# Patient Record
Sex: FEMALE | Race: WHITE | NOT HISPANIC OR LATINO
[De-identification: names, ages, dates, MRNs, and addresses within clinical notes are randomized per-mention and may not be internally consistent; named-entity substitution may affect disease eponyms.]

---

## 2017-11-27 PROBLEM — Z00.00 ENCOUNTER FOR PREVENTIVE HEALTH EXAMINATION: Noted: 2017-11-27

## 2017-12-01 ENCOUNTER — APPOINTMENT (OUTPATIENT)
Dept: HOME HEALTH SERVICES | Facility: HOME HEALTH | Age: 82
End: 2017-12-01
Payer: MEDICARE

## 2017-12-01 VITALS
SYSTOLIC BLOOD PRESSURE: 140 MMHG | HEIGHT: 66 IN | DIASTOLIC BLOOD PRESSURE: 70 MMHG | BODY MASS INDEX: 26.36 KG/M2 | OXYGEN SATURATION: 98 % | WEIGHT: 164 LBS | RESPIRATION RATE: 14 BRPM | TEMPERATURE: 98.8 F | HEART RATE: 68 BPM

## 2017-12-01 VITALS
RESPIRATION RATE: 14 BRPM | DIASTOLIC BLOOD PRESSURE: 70 MMHG | SYSTOLIC BLOOD PRESSURE: 140 MMHG | TEMPERATURE: 98.8 F | HEART RATE: 68 BPM

## 2017-12-01 DIAGNOSIS — Z85.038 PERSONAL HISTORY OF OTHER MALIGNANT NEOPLASM OF LARGE INTESTINE: ICD-10-CM

## 2017-12-01 DIAGNOSIS — Z63.4 DISAPPEARANCE AND DEATH OF FAMILY MEMBER: ICD-10-CM

## 2017-12-01 DIAGNOSIS — Z87.81 PERSONAL HISTORY OF (HEALED) TRAUMATIC FRACTURE: ICD-10-CM

## 2017-12-01 DIAGNOSIS — Z87.891 PERSONAL HISTORY OF NICOTINE DEPENDENCE: ICD-10-CM

## 2017-12-01 PROCEDURE — 99497 ADVNCD CARE PLAN 30 MIN: CPT

## 2017-12-01 PROCEDURE — 99345 HOME/RES VST NEW HIGH MDM 75: CPT | Mod: 25

## 2017-12-01 PROCEDURE — G0506: CPT

## 2017-12-01 SDOH — SOCIAL STABILITY - SOCIAL INSECURITY: DISSAPEARANCE AND DEATH OF FAMILY MEMBER: Z63.4

## 2017-12-21 ENCOUNTER — MEDICATION RENEWAL (OUTPATIENT)
Age: 82
End: 2017-12-21

## 2018-01-02 ENCOUNTER — CLINICAL ADVICE (OUTPATIENT)
Age: 83
End: 2018-01-02

## 2018-01-02 DIAGNOSIS — N39.0 URINARY TRACT INFECTION, SITE NOT SPECIFIED: ICD-10-CM

## 2018-01-30 ENCOUNTER — CLINICAL ADVICE (OUTPATIENT)
Age: 83
End: 2018-01-30

## 2018-02-09 ENCOUNTER — APPOINTMENT (OUTPATIENT)
Dept: HOME HEALTH SERVICES | Facility: HOME HEALTH | Age: 83
End: 2018-02-09
Payer: MEDICARE

## 2018-02-09 VITALS
HEART RATE: 77 BPM | RESPIRATION RATE: 14 BRPM | DIASTOLIC BLOOD PRESSURE: 60 MMHG | HEIGHT: 66 IN | OXYGEN SATURATION: 93 % | SYSTOLIC BLOOD PRESSURE: 106 MMHG | WEIGHT: 164 LBS | BODY MASS INDEX: 26.36 KG/M2 | TEMPERATURE: 99.4 F

## 2018-02-09 DIAGNOSIS — R05 COUGH: ICD-10-CM

## 2018-02-09 DIAGNOSIS — R55 SYNCOPE AND COLLAPSE: ICD-10-CM

## 2018-02-09 DIAGNOSIS — R09.89 OTHER SPECIFIED SYMPTOMS AND SIGNS INVOLVING THE CIRCULATORY AND RESPIRATORY SYSTEMS: ICD-10-CM

## 2018-02-09 DIAGNOSIS — F33.9 MAJOR DEPRESSIVE DISORDER, RECURRENT, UNSPECIFIED: ICD-10-CM

## 2018-02-09 DIAGNOSIS — Z86.59 PERSONAL HISTORY OF OTHER MENTAL AND BEHAVIORAL DISORDERS: ICD-10-CM

## 2018-02-09 PROCEDURE — 99350 HOME/RES VST EST HIGH MDM 60: CPT

## 2018-02-09 RX ORDER — CIPROFLOXACIN HYDROCHLORIDE 250 MG/1
250 TABLET, FILM COATED ORAL
Qty: 14 | Refills: 1 | Status: DISCONTINUED | COMMUNITY
Start: 2018-01-02 | End: 2018-02-09

## 2018-03-16 ENCOUNTER — MED ADMIN CHARGE (OUTPATIENT)
Age: 83
End: 2018-03-16

## 2018-03-16 ENCOUNTER — MEDICATION RENEWAL (OUTPATIENT)
Age: 83
End: 2018-03-16

## 2018-04-19 ENCOUNTER — MEDICATION RENEWAL (OUTPATIENT)
Age: 83
End: 2018-04-19

## 2018-04-27 ENCOUNTER — APPOINTMENT (OUTPATIENT)
Dept: HOME HEALTH SERVICES | Facility: HOME HEALTH | Age: 83
End: 2018-04-27
Payer: MEDICARE

## 2018-04-27 VITALS
HEART RATE: 72 BPM | BODY MASS INDEX: 26.36 KG/M2 | DIASTOLIC BLOOD PRESSURE: 70 MMHG | OXYGEN SATURATION: 96 % | HEIGHT: 66 IN | RESPIRATION RATE: 15 BRPM | WEIGHT: 164 LBS | TEMPERATURE: 96.1 F | SYSTOLIC BLOOD PRESSURE: 110 MMHG

## 2018-04-27 DIAGNOSIS — R26.81 UNSTEADINESS ON FEET: ICD-10-CM

## 2018-04-27 PROCEDURE — 99350 HOME/RES VST EST HIGH MDM 60: CPT

## 2018-04-27 RX ORDER — LORAZEPAM 1 MG/1
1 TABLET ORAL EVERY 8 HOURS
Qty: 1 | Refills: 0 | Status: DISCONTINUED | COMMUNITY
Start: 2018-04-19 | End: 2018-04-27

## 2018-04-27 RX ORDER — BUPROPION HYDROCHLORIDE 100 MG/1
100 TABLET, FILM COATED, EXTENDED RELEASE ORAL DAILY
Qty: 30 | Refills: 3 | Status: DISCONTINUED | COMMUNITY
Start: 2017-12-01 | End: 2018-04-27

## 2018-04-29 PROBLEM — R26.81 GAIT INSTABILITY: Status: ACTIVE | Noted: 2017-12-01

## 2018-05-31 ENCOUNTER — MEDICATION RENEWAL (OUTPATIENT)
Age: 83
End: 2018-05-31

## 2018-06-28 ENCOUNTER — RX RENEWAL (OUTPATIENT)
Age: 83
End: 2018-06-28

## 2018-07-06 ENCOUNTER — APPOINTMENT (OUTPATIENT)
Dept: HOME HEALTH SERVICES | Facility: HOME HEALTH | Age: 83
End: 2018-07-06
Payer: MEDICARE

## 2018-07-06 VITALS
HEART RATE: 75 BPM | HEIGHT: 66 IN | OXYGEN SATURATION: 95 % | BODY MASS INDEX: 26.36 KG/M2 | DIASTOLIC BLOOD PRESSURE: 64 MMHG | WEIGHT: 164 LBS | RESPIRATION RATE: 15 BRPM | TEMPERATURE: 99 F | SYSTOLIC BLOOD PRESSURE: 120 MMHG

## 2018-07-06 PROCEDURE — 99350 HOME/RES VST EST HIGH MDM 60: CPT

## 2018-07-12 ENCOUNTER — RX RENEWAL (OUTPATIENT)
Age: 83
End: 2018-07-12

## 2018-08-07 ENCOUNTER — APPOINTMENT (OUTPATIENT)
Dept: HOME HEALTH SERVICES | Facility: HOME HEALTH | Age: 83
End: 2018-08-07
Payer: MEDICARE

## 2018-08-07 VITALS
OXYGEN SATURATION: 95 % | SYSTOLIC BLOOD PRESSURE: 130 MMHG | TEMPERATURE: 99 F | RESPIRATION RATE: 15 BRPM | HEART RATE: 72 BPM | DIASTOLIC BLOOD PRESSURE: 68 MMHG

## 2018-08-07 PROCEDURE — 99349 HOME/RES VST EST MOD MDM 40: CPT

## 2018-08-18 RX ORDER — QUETIAPINE 25 MG/1
25 TABLET, FILM COATED ORAL DAILY
Qty: 1 | Refills: 0 | Status: DISCONTINUED | COMMUNITY
Start: 2018-04-27 | End: 2018-08-18

## 2018-08-31 ENCOUNTER — RX RENEWAL (OUTPATIENT)
Age: 83
End: 2018-08-31

## 2018-09-11 ENCOUNTER — APPOINTMENT (OUTPATIENT)
Dept: HOME HEALTH SERVICES | Facility: HOME HEALTH | Age: 83
End: 2018-09-11
Payer: MEDICARE

## 2018-09-11 VITALS
OXYGEN SATURATION: 94 % | DIASTOLIC BLOOD PRESSURE: 82 MMHG | TEMPERATURE: 98.4 F | RESPIRATION RATE: 15 BRPM | SYSTOLIC BLOOD PRESSURE: 136 MMHG | HEART RATE: 71 BPM

## 2018-09-11 PROCEDURE — 99350 HOME/RES VST EST HIGH MDM 60: CPT

## 2018-09-13 ENCOUNTER — RX RENEWAL (OUTPATIENT)
Age: 83
End: 2018-09-13

## 2018-09-20 ENCOUNTER — RX RENEWAL (OUTPATIENT)
Age: 83
End: 2018-09-20

## 2018-10-26 ENCOUNTER — MEDICATION RENEWAL (OUTPATIENT)
Age: 83
End: 2018-10-26

## 2018-10-26 ENCOUNTER — RX RENEWAL (OUTPATIENT)
Age: 83
End: 2018-10-26

## 2018-11-01 ENCOUNTER — APPOINTMENT (OUTPATIENT)
Dept: HOME HEALTH SERVICES | Facility: HOME HEALTH | Age: 83
End: 2018-11-01

## 2018-11-02 ENCOUNTER — APPOINTMENT (OUTPATIENT)
Dept: HOME HEALTH SERVICES | Facility: HOME HEALTH | Age: 83
End: 2018-11-02
Payer: MEDICARE

## 2018-11-02 VITALS
DIASTOLIC BLOOD PRESSURE: 68 MMHG | OXYGEN SATURATION: 95 % | SYSTOLIC BLOOD PRESSURE: 124 MMHG | TEMPERATURE: 98.5 F | RESPIRATION RATE: 15 BRPM | HEART RATE: 68 BPM

## 2018-11-02 PROCEDURE — 99349 HOME/RES VST EST MOD MDM 40: CPT

## 2018-12-28 ENCOUNTER — APPOINTMENT (OUTPATIENT)
Dept: HOME HEALTH SERVICES | Facility: HOME HEALTH | Age: 83
End: 2018-12-28
Payer: MEDICARE

## 2018-12-28 VITALS
HEART RATE: 68 BPM | TEMPERATURE: 98.5 F | OXYGEN SATURATION: 98 % | DIASTOLIC BLOOD PRESSURE: 80 MMHG | SYSTOLIC BLOOD PRESSURE: 120 MMHG | RESPIRATION RATE: 15 BRPM

## 2018-12-28 DIAGNOSIS — Z71.89 OTHER SPECIFIED COUNSELING: ICD-10-CM

## 2018-12-28 PROCEDURE — 99497 ADVNCD CARE PLAN 30 MIN: CPT

## 2018-12-28 PROCEDURE — 99349 HOME/RES VST EST MOD MDM 40: CPT | Mod: 25

## 2019-01-10 ENCOUNTER — MEDICATION RENEWAL (OUTPATIENT)
Age: 84
End: 2019-01-10

## 2019-02-05 ENCOUNTER — APPOINTMENT (OUTPATIENT)
Dept: HOME HEALTH SERVICES | Facility: HOME HEALTH | Age: 84
End: 2019-02-05
Payer: MEDICARE

## 2019-02-05 VITALS
HEART RATE: 68 BPM | SYSTOLIC BLOOD PRESSURE: 120 MMHG | DIASTOLIC BLOOD PRESSURE: 64 MMHG | RESPIRATION RATE: 14 BRPM | TEMPERATURE: 98 F | OXYGEN SATURATION: 95 %

## 2019-02-05 DIAGNOSIS — B36.9 SUPERFICIAL MYCOSIS, UNSPECIFIED: ICD-10-CM

## 2019-02-05 PROCEDURE — 99349 HOME/RES VST EST MOD MDM 40: CPT

## 2019-02-05 NOTE — LETTER HEADER
[Care Plan reviewed and provided to patient/caregiver] : Care plan reviewed and provided to patient/caregiver [Care Plan reviewed every ___ weeks] : Care plan reviewed every [unfilled] weeks [Care Plan managed/Care coordinated by: ___] : Care plan managed/Care coordinated by: [unfilled]

## 2019-02-05 NOTE — COUNSELING
[Overweight (BMI 25.1 - 29.9)] : overweight - BMI 25.1-29.9 [Mediterranean diet recommended] : Mediterranean diet recommended [TLC diet recommended] : TLC diet recommended [Non - Smoker] : non-smoker [Use assistive device to avoid falls] : use assistive device to avoid falls [Remove clutter and unsafe carpeting to avoid falls] : remove clutter and unsafe carpeting to avoid falls [Vaccinations: _______] : Vaccinations: [unfilled] [Decrease stress] : decrease stress [Decrease hospital use] : decrease hospital use [Minimize unnecessary interventions] : minimize unnecessary interventions [Comfort Care] : comfort care [Discussed disease trajectory with patient/caregiver] : discussed disease trajectory with patient/caregiver [Likely to achieve goals/desired outcomes] : likely to achieve goals/desired outcomes [Patient/Caregiver has ___ understanding of disease process] : patient/caregiver has [unfilled] understanding of disease process [Patient/Caregiver not ready to engage in discussion] : patient/caregiver not ready to engage in discussion [Advanced Directives discussed: ____] : Advanced directives discussed: [unfilled] [Completed DNR] : completed DNR [Completed Medical Orders for Life-Sustaining Treatment] : completed medical orders for life-sustaining treatment [DNR] : Code Status: DNR [Comfort] : Treatment Guidelines: Comfort [DNI] : Intubation: DNI [[facility]: _____[facility]last verification date: [unfilled] [_____] : HCP: [unfilled]

## 2019-02-06 PROBLEM — B36.9 DERMATITIS FUNGAL: Status: ACTIVE | Noted: 2018-09-11

## 2019-02-06 NOTE — PHYSICAL EXAM
[No Acute Distress] : no acute distress [Well Nourished] : well nourished [Well Developed] : well developed [Normal Sclera/Conjunctiva] : normal sclera/conjunctiva [PERRL] : pupils equal, round and reactive to light [Normal Outer Ear/Nose] : the ears and nose were normal in appearance [Normal Oropharynx] : the oropharynx was normal [No JVD] : no jugular venous distention [Supple] : the neck was supple [No LAD] : no lymphadenopathy [No Respiratory Distress] : no respiratory distress [Clear to Auscultation] : lungs were clear to auscultation bilaterally [No Accessory Muscle Use] : no accessory muscle use [Normal Rate] : heart rate was normal  [Regular Rhythm] : with a regular rhythm [No LE Varicosities] : there were no varicosital changes in the lower extremities [Pedal Pulses Present] : the pedal pulses are present [Normal Appearance] : normal in appearance [Normal Bowel Sounds] : normal bowel sounds [Non Tender] : non-tender [Soft] : abdomen soft [Patient Refused] : rectal exam was refused by the patient [No Spinal Tenderness] : no spinal tenderness [Kyphosis] :  kyphosis present [No Joint Swelling] : no joint swelling seen [No Rash] : no rash [No Motor Deficits] : the motor exam was normal [No Gross Sensory Deficits] : no gross sensory deficits [Normal Affect] : the affect was normal [Over the Past 2 Weeks, Have You Felt Down, Depressed, or Hopeless?] : 1.) Over the past 2 weeks, have you felt down, depressed, or hopeless? No [Over the Past 2 Weeks, Have You Felt Little Interest or Pleasure Doing Things?] : 2.) Over the past 2 weeks, have you felt little interest or pleasure doing things? No [Foot Ulcers] : no foot ulcers [de-identified] :  cooperates with  exam but not  responding to questions  [de-identified] : poor   effort Scattered crackles and rhonchi [de-identified] : umbilical  hernia soft reducible  vertical  scar  in midline [de-identified] : non ambulatory  [de-identified] : erythematous and moist  rash  under right   armpit   [de-identified] : poor memory  short attention span  [de-identified] : alert but  not oriented no insight

## 2019-02-06 NOTE — REVIEW OF SYSTEMS
[Fatigue] : fatigue [Constipation] : constipation [Incontinence] : incontinence [Joint Stiffness] : joint stiffness [Back Pain] : back pain [Memory Loss] : memory loss [Insomnia] : insomnia [Anxiety] : anxiety [Negative] : Respiratory [Shortness Of Breath] : no shortness of breath [Abdominal Pain] : no abdominal pain [Itching] : no itching [Skin Rash] : no skin rash [Easy Bleeding] : no easy bleeding

## 2019-02-06 NOTE — HISTORY OF PRESENT ILLNESS
[Family Member] : family member [FreeTextEntry1] : Parkinsons disease  ,dementia [FreeTextEntry2] : patient is 89  year  old female   homebound due  Parkinsons disease  ,dementia  manic depressive  general  debility \par patient  is seen today for a follow  up  and  has been  stable  since last visit  with  no new major developments  complains, hospitalizations \par   All of the history obtained from son Lan and caretaker  seems more weak  and  does not  sleep at night  but sleeps during the day   more confused  \par  has rash  under both armpits using nystatin \par  Seroquel  she gets  25  at 2 pm and  100  and bedtime\par Also patient on Lexapro  2 pm and    mirtazapine \par Diet regular soft Remains  with  good appetite \par dysphagia none  but has top dentures \par Weight stable   164 lb\par constipation yes stool softener MiraLAX as needed \par incontinence gets to commode  but diapers \par pressure/bed sores none \par Ambulates now bedridden  gets transferred   by aids\par falls none  recently \par Behavior calm   but crying   and  agitated   or frustrated occasionally  \par Mood withdrawn  and sad at times \par sensory deficits  has hearing aids   vision  ok \par pain  denies  gets  Tylenol   if needed \par Goals of care completed\par \par

## 2019-04-10 ENCOUNTER — APPOINTMENT (OUTPATIENT)
Dept: HOME HEALTH SERVICES | Facility: HOME HEALTH | Age: 84
End: 2019-04-10
Payer: MEDICARE

## 2019-04-10 VITALS
DIASTOLIC BLOOD PRESSURE: 78 MMHG | RESPIRATION RATE: 14 BRPM | SYSTOLIC BLOOD PRESSURE: 124 MMHG | HEART RATE: 64 BPM | TEMPERATURE: 98 F | OXYGEN SATURATION: 94 %

## 2019-04-10 DIAGNOSIS — L60.3 NAIL DYSTROPHY: ICD-10-CM

## 2019-04-10 DIAGNOSIS — R39.81 FUNCTIONAL URINARY INCONTINENCE: ICD-10-CM

## 2019-04-10 PROCEDURE — 99349 HOME/RES VST EST MOD MDM 40: CPT

## 2019-04-10 NOTE — COUNSELING
[Overweight (BMI 25.1 - 29.9)] : overweight - BMI 25.1-29.9 [Mediterranean diet recommended] : Mediterranean diet recommended [Non - Smoker] : non-smoker [TLC diet recommended] : TLC diet recommended [Use assistive device to avoid falls] : use assistive device to avoid falls [Remove clutter and unsafe carpeting to avoid falls] : remove clutter and unsafe carpeting to avoid falls [Vaccinations: _______] : Vaccinations: [unfilled] [Decrease stress] : decrease stress [Decrease hospital use] : decrease hospital use [Minimize unnecessary interventions] : minimize unnecessary interventions [Comfort Care] : comfort care [Likely to achieve goals/desired outcomes] : likely to achieve goals/desired outcomes [Discussed disease trajectory with patient/caregiver] : discussed disease trajectory with patient/caregiver [Patient/Caregiver has ___ understanding of disease process] : patient/caregiver has [unfilled] understanding of disease process [Advanced Directives discussed: ____] : Advanced directives discussed: [unfilled] [Patient/Caregiver not ready to engage in discussion] : patient/caregiver not ready to engage in discussion [Completed DNR] : completed DNR [Completed Medical Orders for Life-Sustaining Treatment] : completed medical orders for life-sustaining treatment [Comfort] : Treatment Guidelines: Comfort [DNR] : Code Status: DNR [DNI] : Intubation: DNI [_____] : HCP: [unfilled] [[facility]: _____[facility]/last verification date: [unfilled]

## 2019-04-12 PROBLEM — R39.81 FUNCTIONAL URINARY INCONTINENCE: Status: ACTIVE | Noted: 2018-07-07

## 2019-04-12 NOTE — PHYSICAL EXAM
[No Acute Distress] : no acute distress [Well Nourished] : well nourished [Well Developed] : well developed [Normal Sclera/Conjunctiva] : normal sclera/conjunctiva [Normal Outer Ear/Nose] : the ears and nose were normal in appearance [PERRL] : pupils equal, round and reactive to light [No JVD] : no jugular venous distention [Normal Oropharynx] : the oropharynx was normal [No Respiratory Distress] : no respiratory distress [No LAD] : no lymphadenopathy [Supple] : the neck was supple [Clear to Auscultation] : lungs were clear to auscultation bilaterally [Normal Rate] : heart rate was normal  [No Accessory Muscle Use] : no accessory muscle use [Regular Rhythm] : with a regular rhythm [No LE Varicosities] : there were no varicosital changes in the lower extremities [Normal Appearance] : normal in appearance [Pedal Pulses Present] : the pedal pulses are present [Normal Bowel Sounds] : normal bowel sounds [Non Tender] : non-tender [Soft] : abdomen soft [Patient Refused] : rectal exam was refused by the patient [No Spinal Tenderness] : no spinal tenderness [Kyphosis] :  kyphosis present [No Joint Swelling] : no joint swelling seen [No Motor Deficits] : the motor exam was normal [No Rash] : no rash [Normal Affect] : the affect was normal [No Gross Sensory Deficits] : no gross sensory deficits [Over the Past 2 Weeks, Have You Felt Down, Depressed, or Hopeless?] : 1.) Over the past 2 weeks, have you felt down, depressed, or hopeless? No [Over the Past 2 Weeks, Have You Felt Little Interest or Pleasure Doing Things?] : 2.) Over the past 2 weeks, have you felt little interest or pleasure doing things? No [Foot Ulcers] : no foot ulcers [de-identified] :  cooperates with  exam but not  responding to questions  [de-identified] : poor   effort Scattered crackles and rhonchi [de-identified] : umbilical  hernia soft reducible  vertical  scar  in midline [de-identified] : non ambulatory  [de-identified] : poor memory  short attention span  [de-identified] : erythematous and moist  rash  under right   armpit   [de-identified] : alert but  not oriented no insight

## 2019-04-12 NOTE — REVIEW OF SYSTEMS
[Fatigue] : fatigue [Constipation] : constipation [Incontinence] : incontinence [Joint Stiffness] : joint stiffness [Back Pain] : back pain [Memory Loss] : memory loss [Insomnia] : insomnia [Anxiety] : anxiety [Negative] : Cardiovascular [Shortness Of Breath] : no shortness of breath [Abdominal Pain] : no abdominal pain [Itching] : no itching [Skin Rash] : no skin rash [Easy Bleeding] : no easy bleeding

## 2019-04-12 NOTE — HISTORY OF PRESENT ILLNESS
[Family Member] : family member [FreeTextEntry1] : Parkinsons disease  ,dementia [FreeTextEntry2] : patient is 89  year  old female   homebound due  Parkinsons disease  ,dementia  manic depressive  general  debility \par patient  is seen today for a follow  up  and  has been  stable  since last visit  with  no new major developments  complains, hospitalizations \par   All of the history obtained from son Lan and caretaker  seems more weak  and  does not  sleep at night  but sleeps during the day   more confused  \par  has rash  under both armpits using nystatin  will change   to ,lotrisone  \par  Seroquel  she gets  25  at 2 pm and  100  and bedtime\par Also patient on Lexapro  2 pm and    mirtazapine \par Diet regular soft Remains  with  good appetite \par dysphagia none  but has top dentures \par Weight stable   164 lb\par constipation yes stool softener MiraLAX as needed \par incontinence gets to commode  but diapers \par pressure/bed sores none \par Ambulates now bedridden  gets transferred   by aids\par falls none  recently \par Behavior calm   but crying   and  agitated   or frustrated occasionally  \par Mood withdrawn  and sad at times \par sensory deficits  has hearing aids   vision  ok \par pain  denies  gets  Tylenol   if needed \par Goals of care completed\par \par

## 2019-05-24 ENCOUNTER — RX RENEWAL (OUTPATIENT)
Age: 84
End: 2019-05-24

## 2019-06-06 ENCOUNTER — MEDICATION RENEWAL (OUTPATIENT)
Age: 84
End: 2019-06-06

## 2019-06-25 ENCOUNTER — APPOINTMENT (OUTPATIENT)
Dept: HOME HEALTH SERVICES | Facility: HOME HEALTH | Age: 84
End: 2019-06-25
Payer: MEDICARE

## 2019-06-25 VITALS
OXYGEN SATURATION: 97 % | SYSTOLIC BLOOD PRESSURE: 146 MMHG | RESPIRATION RATE: 14 BRPM | DIASTOLIC BLOOD PRESSURE: 78 MMHG | HEART RATE: 67 BPM | TEMPERATURE: 97.4 F

## 2019-06-25 PROCEDURE — 99349 HOME/RES VST EST MOD MDM 40: CPT

## 2019-06-25 RX ORDER — NYSTATIN 100000 1/G
100000 POWDER TOPICAL 3 TIMES DAILY
Qty: 1 | Refills: 3 | Status: ACTIVE | COMMUNITY
Start: 2019-04-10

## 2019-06-25 RX ORDER — NYSTATIN 100000 [USP'U]/G
100000 CREAM TOPICAL 3 TIMES DAILY
Qty: 1 | Refills: 5 | Status: ACTIVE | COMMUNITY
Start: 2018-09-11

## 2019-06-25 RX ORDER — CLOTRIMAZOLE AND BETAMETHASONE DIPROPIONATE 10; .5 MG/G; MG/G
1-0.05 CREAM TOPICAL TWICE DAILY
Qty: 1 | Refills: 0 | Status: ACTIVE | COMMUNITY
Start: 2019-04-10

## 2019-06-25 NOTE — PHYSICAL EXAM
[No Acute Distress] : no acute distress [Well Nourished] : well nourished [Well Developed] : well developed [Normal Sclera/Conjunctiva] : normal sclera/conjunctiva [PERRL] : pupils equal, round and reactive to light [Normal Outer Ear/Nose] : the ears and nose were normal in appearance [Normal Oropharynx] : the oropharynx was normal [No JVD] : no jugular venous distention [No LAD] : no lymphadenopathy [Supple] : the neck was supple [No Respiratory Distress] : no respiratory distress [Clear to Auscultation] : lungs were clear to auscultation bilaterally [Regular Rhythm] : with a regular rhythm [Normal Rate] : heart rate was normal  [No Accessory Muscle Use] : no accessory muscle use [No LE Varicosities] : there were no varicosital changes in the lower extremities [Pedal Pulses Present] : the pedal pulses are present [Normal Appearance] : normal in appearance [Normal Bowel Sounds] : normal bowel sounds [Non Tender] : non-tender [Patient Refused] : rectal exam was refused by the patient [Soft] : abdomen soft [No Spinal Tenderness] : no spinal tenderness [No Joint Swelling] : no joint swelling seen [Kyphosis] :  kyphosis present [No Gross Sensory Deficits] : no gross sensory deficits [No Motor Deficits] : the motor exam was normal [No Rash] : no rash [Normal Affect] : the affect was normal [Over the Past 2 Weeks, Have You Felt Down, Depressed, or Hopeless?] : 1.) Over the past 2 weeks, have you felt down, depressed, or hopeless? No [Over the Past 2 Weeks, Have You Felt Little Interest or Pleasure Doing Things?] : 2.) Over the past 2 weeks, have you felt little interest or pleasure doing things? No [Foot Ulcers] : no foot ulcers [de-identified] :  cooperates with  exam but not  responding to questions  [de-identified] : poor   effort Scattered crackles and rhonchi [de-identified] : umbilical  hernia soft reducible  vertical  scar  in midline [de-identified] : armpits rash  resolved small subcutaneous  nodule   pea size   left  ischium  mobile non tender    [de-identified] : non ambulatory  [de-identified] : poor memory  short attention span  [de-identified] : alert but  not oriented no insight

## 2019-06-25 NOTE — COUNSELING
[Overweight (BMI 25.1 - 29.9)] : overweight - BMI 25.1-29.9 [Mediterranean diet recommended] : Mediterranean diet recommended [TLC diet recommended] : TLC diet recommended [Use assistive device to avoid falls] : use assistive device to avoid falls [Non - Smoker] : non-smoker [Remove clutter and unsafe carpeting to avoid falls] : remove clutter and unsafe carpeting to avoid falls [Vaccinations: _______] : Vaccinations: [unfilled] [Decrease stress] : decrease stress [Minimize unnecessary interventions] : minimize unnecessary interventions [Decrease hospital use] : decrease hospital use [Comfort Care] : comfort care [Discussed disease trajectory with patient/caregiver] : discussed disease trajectory with patient/caregiver [Likely to achieve goals/desired outcomes] : likely to achieve goals/desired outcomes [Patient/Caregiver has ___ understanding of disease process] : patient/caregiver has [unfilled] understanding of disease process [Patient/Caregiver not ready to engage in discussion] : patient/caregiver not ready to engage in discussion [Completed DNR] : completed DNR [Advanced Directives discussed: ____] : Advanced directives discussed: [unfilled] [DNR] : Code Status: DNR [Completed Medical Orders for Life-Sustaining Treatment] : completed medical orders for life-sustaining treatment [[facility]: [facility]last verification date: [unfilled] [DNI] : Intubation: DNI [Comfort] : Treatment Guidelines: Comfort [_____] : HCP: [unfilled]

## 2019-06-25 NOTE — REVIEW OF SYSTEMS
[Fatigue] : fatigue [Constipation] : constipation [Incontinence] : incontinence [Joint Stiffness] : joint stiffness [Back Pain] : back pain [Insomnia] : insomnia [Memory Loss] : memory loss [Anxiety] : anxiety [Negative] : Respiratory [Shortness Of Breath] : no shortness of breath [Abdominal Pain] : no abdominal pain [Itching] : no itching [Skin Rash] : no skin rash [Easy Bleeding] : no easy bleeding

## 2019-06-25 NOTE — HISTORY OF PRESENT ILLNESS
[Family Member] : family member [FreeTextEntry1] : Parkinsons disease  ,dementia [FreeTextEntry2] : patient is 89  year  old female   homebound due  Parkinsons disease  ,dementia  manic depressive  general  debility \par patient  is seen today for a follow  up  and  has been  stable  since last visit  with  no new major developments  complains, hospitalizations \par   All of the history obtained from son Lan and caretaker  seems more weak  and  does not  sleep at night  but sleeps during the day   more confused  \par rash had resolved now  using  powder\par  Seroquel  she gets  25  at 2 pm and  100  and bedtime\par Also patient on Lexapro  2 pm and    mirtazapine \par Diet regular soft Remains  with  good appetite  pills have to crushed   but able to  swallow\par dysphagia none  but has top dentures l\par Weight stable   164 lb\par constipation yes stool softener MiraLAX as needed \par incontinence gets to commode  but diapers \par pressure/bed sores none \par Ambulates now bedridden  gets transferred   by aids\par falls none  recently \par Behavior calm   but crying   and  agitated   or frustrated occasionally  \par Mood withdrawn  and sad at times \par sensory deficits  has hearing aids   vision  ok \par pain  denies  gets  Tylenol   if needed \par Goals of care completed\par \par

## 2019-07-12 ENCOUNTER — MEDICATION RENEWAL (OUTPATIENT)
Age: 84
End: 2019-07-12

## 2019-07-12 RX ORDER — QUETIAPINE FUMARATE 25 MG/1
25 TABLET ORAL
Qty: 1 | Refills: 8 | Status: ACTIVE | COMMUNITY
Start: 2018-07-12 | End: 1900-01-01

## 2019-09-30 ENCOUNTER — APPOINTMENT (OUTPATIENT)
Dept: HOME HEALTH SERVICES | Facility: HOME HEALTH | Age: 84
End: 2019-09-30

## 2019-10-02 ENCOUNTER — APPOINTMENT (OUTPATIENT)
Dept: HOME HEALTH SERVICES | Facility: HOME HEALTH | Age: 84
End: 2019-10-02
Payer: MEDICARE

## 2019-10-02 VITALS
SYSTOLIC BLOOD PRESSURE: 150 MMHG | HEART RATE: 70 BPM | RESPIRATION RATE: 14 BRPM | DIASTOLIC BLOOD PRESSURE: 72 MMHG | TEMPERATURE: 97 F | OXYGEN SATURATION: 98 %

## 2019-10-02 PROCEDURE — 99350 HOME/RES VST EST HIGH MDM 60: CPT

## 2019-10-11 ENCOUNTER — RX RENEWAL (OUTPATIENT)
Age: 84
End: 2019-10-11

## 2019-10-11 RX ORDER — ESCITALOPRAM OXALATE 10 MG/1
10 TABLET ORAL DAILY
Qty: 90 | Refills: 2 | Status: ACTIVE | COMMUNITY
Start: 2017-12-01 | End: 1900-01-01

## 2019-10-24 ENCOUNTER — MEDICATION RENEWAL (OUTPATIENT)
Age: 84
End: 2019-10-24

## 2019-11-21 ENCOUNTER — MEDICATION RENEWAL (OUTPATIENT)
Age: 84
End: 2019-11-21

## 2019-11-22 ENCOUNTER — RX RENEWAL (OUTPATIENT)
Age: 84
End: 2019-11-22

## 2019-12-09 ENCOUNTER — TRANSCRIPTION ENCOUNTER (OUTPATIENT)
Age: 84
End: 2019-12-09

## 2019-12-31 ENCOUNTER — APPOINTMENT (OUTPATIENT)
Dept: HOME HEALTH SERVICES | Facility: HOME HEALTH | Age: 84
End: 2019-12-31
Payer: MEDICARE

## 2019-12-31 VITALS
SYSTOLIC BLOOD PRESSURE: 120 MMHG | RESPIRATION RATE: 16 BRPM | HEART RATE: 68 BPM | DIASTOLIC BLOOD PRESSURE: 60 MMHG | TEMPERATURE: 97.5 F | OXYGEN SATURATION: 97 %

## 2019-12-31 PROCEDURE — 99349 HOME/RES VST EST MOD MDM 40: CPT

## 2019-12-31 NOTE — HISTORY OF PRESENT ILLNESS
[Family Member] : family member [FreeTextEntry1] : Parkinsons disease  ,dementia [FreeTextEntry2] : patient is 89  year  old female   homebound due  Parkinsons disease  ,dementia  manic depressive  general  debility \par patient  is seen today for a follow  up  and  has been  stable  since last visit  with  no new major developments  complains, hospitalizations \par   All of the history obtained from son Lan and caretaker  seems more weak  and  does not  sleep at night  but sleeps during the day   more confused   he is  interested  in  decreasing meds  \par rash had resolved now  using  powder\par  Seroquel  she gets  25  at 2 pm and  100  and bedtime\par Also patient on Lexapro  2 pm and    mirtazapine \par Diet regular soft Remains  with  good appetite  pills have to crushed   but able to  swallow\par dysphagia none  but has top dentures l\par Weight seems  decreasing   164 lb\par constipation yes stool softener MiraLAX as needed \par incontinence gets to commode  but diapers \par pressure/bed sores none \par Ambulates now bedridden  gets transferred   by aids\par falls none  recently \par Behavior calm   but crying   and  agitated   or frustrated occasionally  \par Mood withdrawn  and sad at times \par sensory deficits  has hearing aids   vision  ok \par pain  denies  gets  Tylenol   if needed \par Goals of care completed\par \par

## 2019-12-31 NOTE — COUNSELING
[Decrease stress] : decrease stress [Decrease hospital use] : decrease hospital use [Minimize unnecessary interventions] : minimize unnecessary interventions [Comfort Care] : comfort care [Discussed disease trajectory with patient/caregiver] : discussed disease trajectory with patient/caregiver [Patient/Caregiver has ___ understanding of disease process] : patient/caregiver has [unfilled] understanding of disease process [Likely to achieve goals/desired outcomes] : likely to achieve goals/desired outcomes [Patient/Caregiver not ready to engage in discussion] : patient/caregiver not ready to engage in discussion [Advanced Directives discussed: ____] : Advanced directives discussed: [unfilled] [Completed DNR] : completed DNR [DNR] : Code Status: DNR [Completed Medical Orders for Life-Sustaining Treatment] : completed medical orders for life-sustaining treatment [Comfort] : Treatment Guidelines: Comfort [DNI] : Intubation: DNI [[facility]: _____] [facility]/last verification date: [unfilled] [_____] : HCP: [unfilled] [Overweight (BMI 25.1 - 29.9)] : overweight - BMI 25.1-29.9 [Mediterranean diet recommended] : Mediterranean diet recommended [TLC diet recommended] : TLC diet recommended [Non - Smoker] : non-smoker [Use assistive device to avoid falls] : use assistive device to avoid falls [Remove clutter and unsafe carpeting to avoid falls] : remove clutter and unsafe carpeting to avoid falls [Vaccinations: _______] : Vaccinations: [unfilled]

## 2019-12-31 NOTE — PHYSICAL EXAM
[No Acute Distress] : no acute distress [Well Nourished] : well nourished [Well Developed] : well developed [Normal Sclera/Conjunctiva] : normal sclera/conjunctiva [PERRL] : pupils equal, round and reactive to light [Normal Outer Ear/Nose] : the ears and nose were normal in appearance [No JVD] : no jugular venous distention [Normal Oropharynx] : the oropharynx was normal [Supple] : the neck was supple [No LAD] : no lymphadenopathy [Clear to Auscultation] : lungs were clear to auscultation bilaterally [No Respiratory Distress] : no respiratory distress [No Accessory Muscle Use] : no accessory muscle use [Regular Rhythm] : with a regular rhythm [Normal Rate] : heart rate was normal  [No LE Varicosities] : there were no varicosital changes in the lower extremities [Pedal Pulses Present] : the pedal pulses are present [Normal Appearance] : normal in appearance [Normal Bowel Sounds] : normal bowel sounds [Non Tender] : non-tender [Soft] : abdomen soft [Patient Refused] : rectal exam was refused by the patient [No Spinal Tenderness] : no spinal tenderness [Kyphosis] :  kyphosis present [No Joint Swelling] : no joint swelling seen [No Rash] : no rash [No Motor Deficits] : the motor exam was normal [No Gross Sensory Deficits] : no gross sensory deficits [Normal Affect] : the affect was normal [Over the Past 2 Weeks, Have You Felt Down, Depressed, or Hopeless?] : 1.) Over the past 2 weeks, have you felt down, depressed, or hopeless? No [Over the Past 2 Weeks, Have You Felt Little Interest or Pleasure Doing Things?] : 2.) Over the past 2 weeks, have you felt little interest or pleasure doing things? No [Foot Ulcers] : no foot ulcers [de-identified] : does  not  cooperate with  exam but not  responding to questions  more  sleepy and frail  [de-identified] : poor   effort Scattered crackles and rhonchi [de-identified] : non ambulatory more contracted   [de-identified] : umbilical  hernia soft reducible  vertical  scar  in midline [de-identified] : serena cleared  no bedsores   [de-identified] : poor memory  short attention span  [de-identified] : alert but  not oriented no insight  moresomnolent

## 2020-01-28 ENCOUNTER — APPOINTMENT (OUTPATIENT)
Dept: HOME HEALTH SERVICES | Facility: HOME HEALTH | Age: 85
End: 2020-01-28

## 2020-01-31 ENCOUNTER — NON-APPOINTMENT (OUTPATIENT)
Age: 85
End: 2020-01-31

## 2020-02-27 ENCOUNTER — RX RENEWAL (OUTPATIENT)
Age: 85
End: 2020-02-27

## 2020-03-09 RX ORDER — CARBIDOPA, LEVODOPA, AND ENTACAPONE 25; 100; 200 MG/1; MG/1; MG/1
25-100-200 TABLET, FILM COATED ORAL
Qty: 1 | Refills: 0 | Status: DISCONTINUED | COMMUNITY
Start: 2017-12-01 | End: 2020-03-09

## 2020-03-09 RX ORDER — CARBIDOPA, LEVODOPA AND ENTACAPONE 25; 100; 200 MG/1; MG/1; MG/1
25-100-200 TABLET, FILM COATED ORAL
Qty: 180 | Refills: 6 | Status: DISCONTINUED | COMMUNITY
Start: 2018-06-28 | End: 2020-03-09

## 2020-03-10 ENCOUNTER — APPOINTMENT (OUTPATIENT)
Dept: HOME HEALTH SERVICES | Facility: HOME HEALTH | Age: 85
End: 2020-03-10
Payer: MEDICARE

## 2020-03-10 VITALS
RESPIRATION RATE: 18 BRPM | TEMPERATURE: 98 F | SYSTOLIC BLOOD PRESSURE: 130 MMHG | DIASTOLIC BLOOD PRESSURE: 70 MMHG | HEART RATE: 71 BPM | OXYGEN SATURATION: 94 %

## 2020-03-10 DIAGNOSIS — F02.81 PARKINSON'S DISEASE: ICD-10-CM

## 2020-03-10 DIAGNOSIS — F13.20 SEDATIVE, HYPNOTIC OR ANXIOLYTIC DEPENDENCE, UNCOMPLICATED: ICD-10-CM

## 2020-03-10 DIAGNOSIS — G20 PARKINSON'S DISEASE: ICD-10-CM

## 2020-03-10 DIAGNOSIS — R53.2 FUNCTIONAL QUADRIPLEGIA: ICD-10-CM

## 2020-03-10 DIAGNOSIS — F32.2 MAJOR DEPRESSIVE DISORDER, SINGLE EPISODE, SEVERE W/OUT PSYCHOTIC FEATURES: ICD-10-CM

## 2020-03-10 PROCEDURE — 99349 HOME/RES VST EST MOD MDM 40: CPT

## 2020-03-10 NOTE — COUNSELING
[Decrease stress] : decrease stress [Decrease hospital use] : decrease hospital use [Minimize unnecessary interventions] : minimize unnecessary interventions [Comfort Care] : comfort care [Discussed disease trajectory with patient/caregiver] : discussed disease trajectory with patient/caregiver [Likely to achieve goals/desired outcomes] : likely to achieve goals/desired outcomes [Patient/Caregiver has ___ understanding of disease process] : patient/caregiver has [unfilled] understanding of disease process [Patient/Caregiver not ready to engage in discussion] : patient/caregiver not ready to engage in discussion [Advanced Directives discussed: ____] : Advanced directives discussed: [unfilled] [Completed DNR] : completed DNR [Completed Medical Orders for Life-Sustaining Treatment] : completed medical orders for life-sustaining treatment [DNR] : Code Status: DNR [Comfort] : Treatment Guidelines: Comfort [DNI] : Intubation: DNI [[facility]: _____[facility]/last verification date: [unfilled] [_____] : HCP: [unfilled] [Overweight (BMI 25.1 - 29.9)] : overweight - BMI 25.1-29.9 [Mediterranean diet recommended] : Mediterranean diet recommended [TLC diet recommended] : TLC diet recommended [Non - Smoker] : non-smoker [Use assistive device to avoid falls] : use assistive device to avoid falls [Remove clutter and unsafe carpeting to avoid falls] : remove clutter and unsafe carpeting to avoid falls [Vaccinations: _______] : Vaccinations: [unfilled]

## 2020-03-10 NOTE — HISTORY OF PRESENT ILLNESS
[Family Member] : family member [FreeTextEntry1] : Parkinsons disease  ,dementia [FreeTextEntry2] : patient is 89  year  old female   homebound due  Parkinsons disease  ,dementia  manic depressive  general  debility \par patient  is seen today for a follow  up  and  has been  stable  since last visit  with  no new major developments  complains, hospitalizations \par   All of the history obtained from son Lan and caretaker \par  seems more weak  and  does not  sleep at night  but sleeps during the day   more confused   he is  interested  in  decreasing meds   stalevo already stopped with no changes in patient  behavior \par  also  inability to clear phlegm  using   glycophyrrholate  \par rash had resolved now  using  powder\par  Seroquel  she gets  25  at 2 pm and  100  and bedtime\par Also patient on Lexapro  2 pm and    mirtazapine \par Diet regular soft Remains  with  good appetite  pills have to crushed   but able to  swallow some cough afterwards \par \par Weight seems  decreasing   160 lb\par constipation yes stool softener MiraLAX as needed \par incontinence gets to commode  but diapers \par pressure/bed sores none \par Ambulates now bedridden  gets transferred   by aids\par falls none  recently \par Behavior calm   but crying   and  agitated   or frustrated occasionally  \par Mood withdrawn  and sad at times \par sensory deficits  has hearing aids   vision  ok \par pain  denies  gets  Tylenol   if needed \par Goals of care completed\par \par

## 2020-03-10 NOTE — PHYSICAL EXAM
[No Acute Distress] : no acute distress [Well Nourished] : well nourished [Well Developed] : well developed [Normal Sclera/Conjunctiva] : normal sclera/conjunctiva [PERRL] : pupils equal, round and reactive to light [Normal Outer Ear/Nose] : the ears and nose were normal in appearance [Normal Oropharynx] : the oropharynx was normal [No JVD] : no jugular venous distention [Supple] : the neck was supple [No LAD] : no lymphadenopathy [No Respiratory Distress] : no respiratory distress [Clear to Auscultation] : lungs were clear to auscultation bilaterally [No Accessory Muscle Use] : no accessory muscle use [Normal Rate] : heart rate was normal  [Regular Rhythm] : with a regular rhythm [No LE Varicosities] : there were no varicosital changes in the lower extremities [Pedal Pulses Present] : the pedal pulses are present [Normal Appearance] : normal in appearance [Normal Bowel Sounds] : normal bowel sounds [Non Tender] : non-tender [Soft] : abdomen soft [Patient Refused] : rectal exam was refused by the patient [No Spinal Tenderness] : no spinal tenderness [Kyphosis] :  kyphosis present [No Joint Swelling] : no joint swelling seen [No Rash] : no rash [No Motor Deficits] : the motor exam was normal [No Gross Sensory Deficits] : no gross sensory deficits [Normal Affect] : the affect was normal [Over the Past 2 Weeks, Have You Felt Down, Depressed, or Hopeless?] : 1.) Over the past 2 weeks, have you felt down, depressed, or hopeless? No [Over the Past 2 Weeks, Have You Felt Little Interest or Pleasure Doing Things?] : 2.) Over the past 2 weeks, have you felt little interest or pleasure doing things? No [Foot Ulcers] : no foot ulcers [de-identified] : does  not  cooperate with  exam but not  responding to questions  more  sleepy and frail  [de-identified] : poor   effort Scattered crackles and rhonchi [de-identified] : umbilical  hernia soft reducible  vertical  scar  in midline [de-identified] : non ambulatory more contracted   [de-identified] : serena cleared  no bedsores   [de-identified] : poor memory  short attention span  [de-identified] : alert but  not oriented no insight  moresomnolent

## 2020-03-17 ENCOUNTER — TRANSCRIPTION ENCOUNTER (OUTPATIENT)
Age: 85
End: 2020-03-17

## 2020-03-19 ENCOUNTER — RX RENEWAL (OUTPATIENT)
Age: 85
End: 2020-03-19

## 2020-03-19 RX ORDER — LORAZEPAM 0.5 MG/1
0.5 TABLET ORAL
Qty: 90 | Refills: 0 | Status: ACTIVE | COMMUNITY
Start: 2018-04-27 | End: 1900-01-01

## 2020-03-19 RX ORDER — MIRTAZAPINE 15 MG/1
15 TABLET, FILM COATED ORAL
Qty: 30 | Refills: 0 | Status: ACTIVE | COMMUNITY
Start: 2017-12-01 | End: 1900-01-01

## 2020-03-25 RX ORDER — FLUTICASONE PROPIONATE 50 UG/1
50 SPRAY, METERED NASAL
Qty: 1 | Refills: 0 | Status: ACTIVE | COMMUNITY
Start: 2020-03-25 | End: 1900-01-01

## 2020-04-02 ENCOUNTER — RX RENEWAL (OUTPATIENT)
Age: 85
End: 2020-04-02

## 2020-04-02 RX ORDER — QUETIAPINE FUMARATE 100 MG/1
100 TABLET ORAL
Qty: 30 | Refills: 0 | Status: ACTIVE | COMMUNITY
Start: 2017-12-01 | End: 1900-01-01

## 2020-04-07 RX ORDER — GLYCOPYRROLATE 1 MG/1
1 TABLET ORAL
Qty: 90 | Refills: 3 | Status: ACTIVE | COMMUNITY
Start: 2020-01-31 | End: 1900-01-01

## 2020-04-08 ENCOUNTER — TRANSCRIPTION ENCOUNTER (OUTPATIENT)
Age: 85
End: 2020-04-08

## 2020-05-04 ENCOUNTER — APPOINTMENT (OUTPATIENT)
Dept: HOME HEALTH SERVICES | Facility: HOME HEALTH | Age: 85
End: 2020-05-04

## 2020-12-16 PROBLEM — N39.0 ACUTE UTI: Status: RESOLVED | Noted: 2018-01-02 | Resolved: 2020-12-16
